# Patient Record
Sex: FEMALE | Race: BLACK OR AFRICAN AMERICAN
[De-identification: names, ages, dates, MRNs, and addresses within clinical notes are randomized per-mention and may not be internally consistent; named-entity substitution may affect disease eponyms.]

---

## 2021-10-16 ENCOUNTER — HOSPITAL ENCOUNTER (EMERGENCY)
Dept: HOSPITAL 63 - ER | Age: 21
Discharge: HOME | End: 2021-10-16
Payer: COMMERCIAL

## 2021-10-16 VITALS — BODY MASS INDEX: 23.49 KG/M2 | WEIGHT: 149.69 LBS | HEIGHT: 67 IN

## 2021-10-16 VITALS — DIASTOLIC BLOOD PRESSURE: 61 MMHG | SYSTOLIC BLOOD PRESSURE: 117 MMHG

## 2021-10-16 DIAGNOSIS — S16.1XXA: Primary | ICD-10-CM
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
## 2021-10-16 DIAGNOSIS — S20.219A: ICD-10-CM
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
## 2021-10-16 PROCEDURE — 81025 URINE PREGNANCY TEST: CPT

## 2021-10-16 PROCEDURE — 70490 CT SOFT TISSUE NECK W/O DYE: CPT

## 2021-10-16 PROCEDURE — 71275 CT ANGIOGRAPHY CHEST: CPT

## 2021-10-16 PROCEDURE — 71250 CT THORAX DX C-: CPT

## 2021-10-16 PROCEDURE — 96372 THER/PROPH/DIAG INJ SC/IM: CPT

## 2021-10-16 PROCEDURE — 99285 EMERGENCY DEPT VISIT HI MDM: CPT
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
